# Patient Record
Sex: FEMALE | Race: WHITE | HISPANIC OR LATINO | Employment: UNEMPLOYED | URBAN - METROPOLITAN AREA
[De-identification: names, ages, dates, MRNs, and addresses within clinical notes are randomized per-mention and may not be internally consistent; named-entity substitution may affect disease eponyms.]

---

## 2018-01-10 NOTE — PROGRESS NOTES
AUG 22 2016         RE: Irma Stein                               To: THUY   MR#: [id number]                                   [address]   : [address]    ENC: [id number]                             Fax: [phone]   (Exam #: [id number])      The LMP of this 26year old,  G2, P1-0-0-1 patient was unknown, her   working RAMIREZ is DEC 9 2016 and the current gestational age is 23 weeks 3   days by 1st Trimester Sono  A sonographic examination was performed on AUG   22 2016 using real time equipment  The ultrasound examination was   performed using abdominal technique  The patient has a BMI of 22 1  Her   blood pressure today was 110/63  Earliest ultrasound done in Radiology on 5/10/16  9w4d and 9w2d 16   RAMIREZ based on the larger baby      Problem list   1  Monochorionic diamniotic twin pregnancy with a central PCI times 2 and   both babies are male  2  History of a small for gestational age baby at term weighing 5 lbs  3   oz        Fetus A   Cardiac motion was observed at 156 bpm       Fetus B   Cardiac motion was observed at 142 bpm       INDICATIONS      multiple gestation- monochorionic  diamniotic twins   Interval growth assesment   TTS surveillence      Exam Types      Level I      RESULTS      Fetus # 1 of 2   Vertex presentation   Fetal growth appeared normal   Fetal position = Maternal Right   Placenta Location = Posterior   No placenta previa   Placenta Grade = II   Chorionicity = Monochorionic, Diamniotic      Fetus # 2 of 2   Breech presentation   Fetal growth appeared normal   Fetal position = Maternal Left   Placenta Location = Posterior, left lateral   Placenta Grade = II   Chorionicity = Monochorionic, Diamniotic      MEASUREMENTS (* Included In Average GA)      FETUS A   AC              18 8 cm        23 weeks 3 days* (28%)   BPD              5 6 cm        23 weeks 0 days* (16%) HC              21 2 cm        23 weeks 1 day * (13%)   Femur            4 3 cm        24 weeks 3 days* (39%)      Cerebellum       2 6 cm        24 weeks 6 days      HC/AC           1 13   FL/AC           0 23   FL/BPD          0 78   EFW (Ac/Fl/Hc)   623 grams - 1 lbs 6 oz                 (35%)      Fetus A AVERAGE G  A  is 23 weeks 4 days +/- 14 days  FETUS B   AC              18 8 cm        23 weeks 2 days* (27%)   BPD              5 6 cm        23 weeks 1 day * (20%)   HC              21 0 cm        23 weeks 0 days* (9%)   Femur            4 2 cm        23 weeks 6 days* (30%)      Cerebellum       2 7 cm        25 weeks 0 days      HC/AC           1 12   FL/AC           0 22   FL/BPD          0 75   EFW (Ac/Fl/Hc)   603 grams - 1 lbs 5 oz                 (32%)      Fetus B AVERAGE G  A  is 23 weeks 3 days +/- 14 days  AMNIOTIC FLUID      Fetus A      Largest Vertical Pocket = 3 6 cm   Amniotic Fluid: Normal         Fetus B      Largest Vertical Pocket = 4 3 cm   Amniotic Fluid: Normal      CERVICAL EVALUATION      SUPINE      Cervical Length: 4 43 cm      ANATOMY DETAILS      Fetus A   Visualized Appearing Sonographically Normal:   HEAD: (Calvarium, BPD Level, Cavum, Lateral Ventricles, Choroid Plexus,   Cerebellum, Cisterna Magna);    TH  CAV : (Diaphragm); HEART: (Four   Chamber View, Interventricular Septum, Interatrial Septum, Cardiac Axis,   Cardiac Position);    STOMACH, RIGHT KIDNEY, LEFT KIDNEY, BLADDER, PLACENTA      Fetus B   Visualized Appearing Sonographically Normal:   HEAD: (Calvarium, BPD Level, Lateral Ventricles, Choroid Plexus,   Cerebellum);    TH  CAV : (Diaphragm);     HEART: (Interventricular Septum,   Interatrial Septum);    STOMACH, RIGHT KIDNEY, LEFT KIDNEY, BLADDER,   PLACENTA      IMPRESSION      Twin IUP (Fetus A)   23 weeks and 4 days by this ultrasound  (RAMIREZ=DEC 15 2016)   24 weeks and 3 days by 1st Tri Sono  (RAMIREZ=DEC 9 2016)   Vertex presentation   Fetal growth appeared normal   Regular fetal heart rate of 156 bpm   Posterior placenta   No placenta previa   Fetal position = Maternal , Right   Monochorionic, diamniotic      Twin IUP (Fetus B)   23 weeks and 3 days by this ultrasound  (RAMIREZ=DEC 16 2016)   24 weeks and 3 days by 1st Tri Sono  (RAMIREZ=DEC 9 2016)   Breech presentation   Fetal growth appeared normal   Regular fetal heart rate of 142 bpm   Posterior, left lateral placenta   Fetal position = Maternal , Left   Monochorionic, diamniotic      GENERAL COMMENT      A normal bladder and stomach are seen on both babies  Normal fluid is seen   in both sacs  Both babies are active  There is no suggestion by ultrasound   today for twin to twin transfusion  Plan review for TTTS in 2 weeks and growth and review for TTTS in 4 weeks  BRETT Contreras M D     Maternal-Fetal Medicine   Electronically signed 08/22/16 17:51

## 2018-01-11 NOTE — PROGRESS NOTES
NOV 3 2016         RE: Allison Kolb                               To: THUY   MR#: [id number]                                   [address]   : [address]   [facility] 25: [id number]                             Fax: [phone]   (Exam #: [id number])      The LMP of this 26year old,  G2, P1-0-0-1 patient was unknown, her   working RAMIREZ is DEC 9 2016 and the current gestational age is 29 weeks 6   days by 1st Trimester Sono  A sonographic examination was performed on NOV   3 2016 using real time equipment  The ultrasound examination was performed   using abdominal technique  The patient has a BMI of 25 0  Her blood   pressure today was 128/78  Earliest ultrasound done in Radiology on 5/10/16  9w4d and 9w2d 16   RAMIREZ based on the larger baby      Problem list   1  Monochorionic diamniotic twin pregnancy with a central PCI times 2 and   both babies are male  2  History of a small for gestational age baby at term weighing 6 lbs  3   oz  3  IUGR "B" at 33+ weeks      Fetus A   Cardiac motion was observed at 155 bpm       Fetus B   Cardiac motion was observed at 144 bpm       INDICATIONS      multiple gestation- monochorionic  diamniotic twins      Exam Types      amniotic fluid evaluation   NST      RESULTS      Fetus # 1 of 2   Vertex presentation   Fetal position = Superior, Maternal Right   Placenta Location = Posterior, left lateral   No placenta previa   Placenta Grade = II   Chorionicity = Monochorionic, Diamniotic      Fetus # 2 of 2   Vertex presentation   Fetal position = Inferior, Maternal Left   Placenta Location = Left lateral   No placenta previa   Placenta Grade = II   Chorionicity = Monochorionic, Diamniotic      Fetus A   The NST was reactive with no decelerations  Fetus B   The NST was reactive with no decelerations        AMNIOTIC FLUID      Fetus A      Largest Vertical Pocket = 5 5 cm   Amniotic Fluid: Normal         Fetus B      Largest Vertical Pocket = 3 4 cm   Amniotic Fluid: Normal      IMPRESSION      Twin IUP (Fetus A)   34 weeks and 6 days by 1st Tri Sono  (RAMIREZ=DEC 9 2016)   Vertex presentation   Fetal heart rate of 155 bpm   Posterior, left lateral placenta   No placenta previa   Fetal position = Superior, Right   Monochorionic, diamniotic      Twin IUP (Fetus B)   34 weeks and 6 days by 1st Tri Sono  (RAMIREZ=DEC 9 2016)   Vertex presentation   Regular fetal heart rate of 144 bpm   Left lateral placenta   No placenta previa   Fetal position = Inferior, Left   Monochorionic, diamniotic      RECOMMENDATION      PAL: 1 Week   NST: 2X per week      BRETT Dangelo M D     Maternal-Fetal Medicine   Electronically signed 11/04/16 15:37

## 2018-01-11 NOTE — PROGRESS NOTES
OCT 24 2016         RE: Tang Son                               To: Harley Betancur   MR#: [id number]                                   [address]   : [address] 25: [id number]                             Fax: [phone]   (Exam #: [id number])      The LMP of this 26year old,  G2, P1-0-0-1 patient was unknown, her   working RAMIREZ is DEC 9 2016 and the current gestational age is 29 weeks 3   days by 1st Trimester Sono  A sonographic examination was performed on OCT   24 2016 using real time equipment  The ultrasound examination was   performed using abdominal technique  The patient has a BMI of 24 8  Her   blood pressure today was 127/67  Earliest ultrasound done in Radiology on 5/10/16  9w4d and 9w2d 16   RAMIREZ based on the larger baby      Problem list   1  Monochorionic diamniotic twin pregnancy with a central PCI times 2 and   both babies are male  2  History of a small for gestational age baby at term weighing 6 lbs  3   oz  3  IUGR "B" at 33+ weeks         Mary has no complaints today  She reports regular fetal movements and   denies problems related to hypertension, gestational diabetes,    labor, or vaginal bleeding        Fetus A   Cardiac motion was observed at 157 bpm       Fetus B   Cardiac motion was observed at 130 bpm       INDICATIONS      multiple gestation- monochorionic  diamniotic twins      Exam Types      Level I   NST   Doppler study      RESULTS      Fetus # 1 of 2   Vertex presentation   Fetal growth appeared normal   Fetal position = Maternal Right   Placenta Location = Posterior   No placenta previa   Placenta Grade = II   Chorionicity = Monochorionic, Diamniotic      Fetus # 2 of 2   Vertex presentation   Probable symmetric IUGR   Fetal position = Maternal Left   Placenta Location = Posterior   No placenta previa   Placenta Grade = II   Chorionicity = Monochorionic, Diamniotic      Fetus A   The NST was reactive with no decelerations  Fetus B   The NST was reactive with no decelerations  MEASUREMENTS (* Included In Average GA)      FETUS A   AC              28 9 cm        33 weeks 0 days* (40%)   BPD              7 6 cm        30 weeks 4 days* (<5%)   HC              28 2 cm        30 weeks 4 days* (<5%)   Femur            6 0 cm        31 weeks 1 day * (14%)      Humerus          5 3 cm        30 weeks 5 days  (12%)      Cerebellum       4 2 cm        34 weeks 2 days      HC/AC           0 98   FL/AC           0 21   FL/BPD          0 79   EFW (Ac/Fl/Hc)  1887 grams - 4 lbs 3 oz                 (33%)      Fetus A AVERAGE G  A  is 31 weeks 2 days +/- 18 days  FETUS B   AC              25 5 cm        29 weeks 5 days* (<5%)   BPD              7 8 cm        31 weeks 3 days* (8%)   HC              27 7 cm        30 weeks 0 days* (<5%)   Femur            6 1 cm        31 weeks 4 days* (20%)      Humerus          5 3 cm        30 weeks 5 days  (12%)      HC/AC           1 08   FL/AC           0 24   FL/BPD          0 78   EFW (Ac/Fl/Hc)  1566 grams - 3 lbs 7 oz                 (7%)      Fetus B AVERAGE G  A  is 30 weeks 5 days +/- 18 days  AMNIOTIC FLUID      Fetus A      Largest Vertical Pocket = 3 3 cm   Amniotic Fluid: Normal         Fetus B      Largest Vertical Pocket = 4 4 cm   Amniotic Fluid: Normal      FETAL VESSELS      Fetus B                                  S/D   PI    RI    PSV   AEDV RF                                                    cm/s       Umbilical Artery                 0 62       Middle Cerebral Artery           1 41      ANATOMY DETAILS      Fetus A   Visualized Appearing Sonographically Normal:   HEAD: (Calvarium, BPD Level, Cavum, Lateral Ventricles, Cerebellum);      TH  CAV : (Diaphragm);     HEART: ([employer], Cardiac Axis);      STOMACH, RIGHT KIDNEY, LEFT KIDNEY, BLADDER, PLACENTA      Fetus B   Visualized Appearing Sonographically Normal:   HEAD: (Calvarium, BPD Level);    TH  CAV : (Diaphragm); HEART: (Four   Chamber View, Proximal Left Outflow, Proximal Right Outflow, 3 Vessel   Trachea, Cardiac Axis);    STOMACH, RIGHT KIDNEY, LEFT KIDNEY, BLADDER,   PLACENTA      IMPRESSION      Twin IUP (Fetus A)   31 weeks and 2 days by this ultrasound  (RAMIREZ=DEC 24 2016)   33 weeks and 3 days by 1st Tri Sono  (RAMIREZ=DEC 9 2016)   Vertex presentation   Fetal growth appeared normal   Regular fetal heart rate of 157 bpm   Posterior placenta   No placenta previa   Fetal position = Maternal , Right   Monochorionic, diamniotic      Twin IUP (Fetus B)   30 weeks and 5 days by this ultrasound  (RAMIREZ=DEC 28 2016)   33 weeks and 3 days by 1st Tri Sono  (RAMIREZ=DEC 9 2016)   Vertex presentation   Growth assessment indicated probable symmetric IUGR   Regular fetal heart rate of 130 bpm   Posterior placenta   No placenta previa   Fetal position = Maternal , Left   Monochorionic, diamniotic      GENERAL COMMENT      No fetal structural abnormality is identified on the Level I surveys   today  Fetus "A" is normally grown, with appropriate interval growth  Fetus "B"  is growth restricted, with an estimated fetal weight placed at   the 7th percentile for this gestational age  The intertwin growth   discordance is 17%  The  amniotic fluid volumes are normal  The Doppler   studies with respect to fetus "B" are normal  The cerebroplacental ratio   is normal       Today's ultrasound findings and suggested follow-up were discussed in   detail with Mary  Continuation of twice per week fetal nonstress testing   and weekly amniotic fluid volume assessments is recommended  Repeat fetal   Doppler studies are recommended with respect to fetus "B"  in one week  Interval growth scans will be performed in 2 weeks   Delivery of this   monochorionic, diamniotic twin pregnancy is recommended at 39 to 37 weeks   gestation, earlier if otherwise clinically indicated  The face to face time, in addition to time spent discussing ultrasound   results, was approximately 10 minutes, greater than 50% of which was spent   during counseling and coordination of care  BRETT Marie M D     Maternal-Fetal Medicine   Electronically signed 10/24/16 18:40

## 2018-01-12 NOTE — PROGRESS NOTES
SEP 6 2016         RE: Stuely Dona                               To: Harley Estr[address] 26   MR#: [id number]                                   [address]   : [address] 25: [id number]                             Fax: [phone]   (Exam #: [id number])      The LMP of this 26year old,  G2, P1-0-0-1 patient was unknown, her   working RAMIREZ is DEC 9 2016 and the current gestational age is 29 weeks 4   days by 1st Trimester Sono  A sonographic examination was performed on SEP   6 2016 using real time equipment  The ultrasound examination was performed   using abdominal technique  The patient has a BMI of 22 5  Her blood   pressure today was 104/54  Earliest ultrasound done in Radiology on 5/10/16  9w4d and 9w2d 16   RAMIREZ based on the larger baby      Problem list   1  Monochorionic diamniotic twin pregnancy with a central PCI times 2 and   both babies are male  2  History of a small for gestational age baby at term weighing 5 lbs  3   oz  Mary has no complaints today  She reports regular fetal movements and   denies problems related to hypertension, gestational diabetes,    labor, or vaginal bleeding        Fetus A   Cardiac motion was observed at 149 bpm       Fetus B   Cardiac motion was observed at 147 bpm       INDICATIONS      multiple gestation- monochorionic  diamniotic twins      Exam Types      Level I      RESULTS      Fetus # 1 of 2   Vertex presentation   Fetal position = Inferior, Maternal Right   Placenta Location = Posterior   No placenta previa   Placenta Grade = I   Chorionicity = Monochorionic, Diamniotic      Fetus # 2 of 2   Breech presentation   Fetal position = Superior, Maternal Left   Placenta Location = Posterior, left lateral   No placenta previa   Placenta Grade = I   Chorionicity = Monochorionic, Diamniotic      AMNIOTIC FLUID      Fetus A      Largest Vertical Pocket = 4 1 cm   Amniotic Fluid: Normal         Fetus B      Largest Vertical Pocket = 3 1 cm   Amniotic Fluid: Normal      ANATOMY DETAILS      Fetus A   Visualized Appearing Sonographically Normal:   STOMACH, BLADDER      Fetus B   Visualized Appearing Sonographically Normal:   STOMACH, BLADDER      IMPRESSION      Twin IUP (Fetus A)   26 weeks and 4 days by 1st Tri Sono  (RAMIREZ=DEC 9 2016)   Vertex presentation   Regular fetal heart rate of 149 bpm   Posterior placenta   No placenta previa   Fetal position = Inferior, Right   Monochorionic, diamniotic      Twin IUP (Fetus B)   26 weeks and 4 days by 1st Tri Sono  (RAMIREZ=DEC 9 2016)   Breech presentation   Regular fetal heart rate of 147 bpm   Posterior, left lateral placenta   No placenta previa   Fetal position = Superior, Left   Monochorionic, diamniotic      GENERAL COMMENT      Detailed evaluations of fetal growth and anatomy were not performed at the   visit today  Instead, ultrasound evaluation was performed for twin to twin   transfusion surveillance  Normal amniotic fluid volumes are noted on   either side of a thin, single-layered dividing membrane  Each fetus has a   normal-appearing urinary bladder  There is no evidence of evolving TTTS  The single posterior placenta is normal in appearance  Today's ultrasound findings and suggested follow-up were discussed in   detail with Mary  She will return to the [facility]  in 2 weeks for   TTTS surveillance and interval fetal growth scans  Serial ultrasound   studies are then recommended throughout the remainder of the pregnancy  Additional pregnancy surveillance is recommended with non stress testing   beginning at 30-32 weeks gestation  Delivery of this monochorionic,   diamniotic twin pregnancy is recommended at 36-37 weeks gestation, earlier   if otherwise clinically indicated        The face to face time, in addition to time spent discussing ultrasound   results, was 10 minutes, greater than 50% of which was spent during   counseling and coordination of care  BRETT Reyes M D     Maternal-Fetal Medicine   Electronically signed 09/07/16 10:22

## 2018-01-13 NOTE — PROGRESS NOTES
SEP 20 2016         RE: Siva Kessler                               To: THUY   MR#: [id number]                                   [address]   : [address] 25: [id number]                             Fax: [phone]   (Exam #: [id number])      The LMP of this 26year old,  G2, P1-0-0-1 patient was unknown, her   working RAMIREZ is DEC 9 2016 and the current gestational age is 35 weeks 4   days by 1st Trimester Sono  A sonographic examination was performed on SEP   20 2016 using real time equipment  The ultrasound examination was   performed using abdominal technique  The patient has a BMI of 19 5  Her   blood pressure today was 98/67  Earliest ultrasound done in Radiology on 5/10/16  9w4d and 9w2d 16   RAMIREZ based on the larger baby      Problem list   1  Monochorionic diamniotic twin pregnancy with a central PCI times 2 and   both babies are male  2  History of a small for gestational age baby at term weighing 6 lbs  3   oz        Fetus A   Cardiac motion was observed at 153 bpm       Fetus B   Cardiac motion was observed at 148 bpm       INDICATIONS      multiple gestation- monochorionic  diamniotic twins   fetal growth      Exam Types      Level I      RESULTS      Fetus # 1 of 2   Breech presentation   Fetal growth appeared normal   Fetal position = Inferior, Maternal Right   Placenta Location = Posterior   No placenta previa   Placenta Grade = II   Chorionicity = Monochorionic, Diamniotic      Fetus # 2 of 2   Vertex presentation   Fetal growth appeared normal   Fetal position = Maternal Left   Placenta Location = Posterior, left lateral   No placenta previa   Placenta Grade = II      MEASUREMENTS (* Included In Average GA)      FETUS A   AC              23 7 cm        28 weeks 0 days* (34%)   BPD              6 8 cm        27 weeks 3 days* (20%)   HC              25 1 cm        27 weeks 0 days* (<5%)   Femur            5 2 cm        28 weeks 0 days* (28%)      Cerebellum       3 1 cm        28 weeks 2 days      HC/AC           1 06   FL/AC           0 22   FL/BPD          0 77   EFW (Ac/Fl/Hc)  1135 grams - 2 lbs 8 oz                 (31%)      Fetus A AVERAGE G  A  is 27 weeks 4 days +/- 14 days  FETUS B   AC              23 0 cm        27 weeks 2 days* (22%)   BPD              6 6 cm        26 weeks 4 days* (<5%)   HC              24 6 cm        26 weeks 4 days* (<5%)   Femur            5 1 cm        27 weeks 3 days* (18%)      Cerebellum       3 2 cm        28 weeks 4 days      HC/AC           1 07   FL/AC           0 22   FL/BPD          0 78   EFW (Ac/Fl/Hc)  1045 grams - 2 lbs 5 oz                 (23%)      Fetus B AVERAGE G  A  is 26 weeks 6 days +/- 14 days  AMNIOTIC FLUID      Fetus A      Largest Vertical Pocket = 4 0 cm   Amniotic Fluid: Normal         Fetus B      Largest Vertical Pocket = 4 2 cm   Amniotic Fluid: Normal      ANATOMY DETAILS      Fetus A   Visualized Appearing Sonographically Normal:   HEAD: (Calvarium, BPD Level, Cavum, Lateral Ventricles, Choroid Plexus,   Cerebellum);    TH  CAV : (Diaphragm); HEART: ([employer] [facility],   Cardiac Axis, Cardiac Position);    ABD  CAV , STOMACH, RIGHT KIDNEY, LEFT   KIDNEY, BLADDER, PLACENTA      Not Visualized:   HEART: (Proximal Left Outflow, Proximal Right Outflow)      Fetus B   Visualized Appearing Sonographically Normal:   HEAD: (Calvarium, BPD Level, Cavum, Lateral Ventricles, Choroid Plexus,   Cerebellum);    TH  CAV : (Diaphragm);     HEART: (Four Chamber View,   Proximal Left Outflow, Proximal Right Outflow, 3 Vessel Trachea, Short   Axis of Greater Vessels, Interventricular Septum, Interatrial Septum,   Cardiac Axis, Cardiac Position);    ABD  CAV , STOMACH, RIGHT KIDNEY, LEFT   KIDNEY, BLADDER, PLACENTA      IMPRESSION      Twin IUP (Fetus A)   27 weeks and 4 days by this ultrasound  (RAMIREZ=DEC 16 2016)   28 weeks and 4 days by 1st Tri Sono  (RAMIREZ=DEC 9 2016)   Breech presentation   Fetal growth appeared normal   Regular fetal heart rate of 153 bpm   Posterior placenta   No placenta previa   Fetal position = Inferior, Right   Monochorionic, diamniotic      Twin IUP (Fetus B)   26 weeks and 6 days by this ultrasound  (RAMIREZ=DEC 21 2016)   28 weeks and 4 days by 1st Tri Sono  (RAMIREZ=DEC 9 2016)   Vertex presentation   Fetal growth appeared normal   Regular fetal heart rate of 148 bpm   Posterior, left lateral placenta   No placenta previa   Fetal position = Maternal , Left      GENERAL COMMENT      On exam today the patient appears well, in no acute distress, and denies   any complaints  Her abdomen is non-tender  There has been appropriate and concordant twins interval fetal growth  Good fetal movement and tone are seen  The amniotic fluid volume appears   normal around each twin  The anatomic structures listed above could not be   optimally imaged today because of fetal position; however, these   structures were seen on a prior ultrasound and appeared sonographically   normal   The patient was informed of today's findings and all of her   questions were answered  The limitations of ultrasound were reviewed with   the patient   labor precautions and fetal kick counts were   reviewed  Recommend the patient return in 2 weeks for twin-twin transfusion syndrome   and in 4 weeks for a fetal growth evaluation  Thank you very much for allowing us to participate in the care of this   very nice patient  Should you have any questions, please do not hesitate   to contact our office  BRETT Solorio M D     Electronically signed 16 17:10

## 2018-01-13 NOTE — PROGRESS NOTES
OCT 3 2016         RE: Oswaldo Lot                               To: THUY   MR#: [id number]                                   [address]   : [address]   ENC:                                              Fax: [phone]   (Exam #: [id number])      The LMP of this 26year old,  G2, P1-0-0-1 patient was unknown, her   working RAMIREZ is DEC 5 2016 and the current gestational age is 31 weeks 3   days by 1st Trimester Sono  A sonographic examination was performed on OCT   3 2016 using real time equipment  The ultrasound examination was performed   using abdominal technique  The patient has a BMI of 23 6  Her blood   pressure today was 99/55  Earliest ultrasound done in Radiology on 5/10/16  9w4d and 9w2d 16   RAMIREZ based on the larger baby      Problem list   1  Monochorionic diamniotic twin pregnancy with a central PCI times 2 and   both babies are male  2  History of a small for gestational age baby at term weighing 6 lbs  3   oz        Fetus A   Cardiac motion was observed at 151 bpm       Fetus B   Cardiac motion was observed at 150 bpm       INDICATIONS      multiple gestation- monochorionic  diamniotic twins      Exam Types      mono/di  twin gest   Amniotic Fluid Index      RESULTS      Fetus # 1 of 2   Vertex presentation   Fetal position = Maternal Right   Placenta Location = Posterior   No placenta previa   Placenta Grade = II   Chorionicity = Monochorionic, Diamniotic      Fetus # 2 of 2   Vertex presentation   Fetal position = Maternal Left   Placenta Location = Posterior   No placenta previa   Placenta Grade = II   Chorionicity = Monochorionic, Diamniotic      AMNIOTIC FLUID      Fetus A      Largest Vertical Pocket = 6 1 cm   Amniotic Fluid: Normal         Fetus B      Largest Vertical Pocket = 6 0 cm   Amniotic Fluid: Normal      ANATOMY DETAILS      Fetus A   Visualized Appearing Sonographically Normal:   STOMACH, BLADDER      Fetus B   Visualized Appearing Sonographically Normal:   STOMACH, BLADDER      IMPRESSION      Twin IUP (Fetus A)   30 weeks and 3 days by 1st Tri Sono  (RAMIREZ=DEC 9 2016)   Vertex presentation   Regular fetal heart rate of 151 bpm   Posterior placenta   No placenta previa   Fetal position = Maternal , Right   Monochorionic, diamniotic      Twin IUP (Fetus B)   30 weeks and 3 days by 1st Tri Sono  (RAMIREZ=DEC 9 2016)   Vertex presentation   Regular fetal heart rate of 150 bpm   Posterior placenta   No placenta previa   Fetal position = Maternal , Left   Monochorionic, diamniotic      GENERAL COMMENT      There is no evidence to suggest evolving twin-twin transfusion based upon   today's ultrasound  The patient is scheduled to return in 2 weeks for a   fetal growth ultrasound and to initiate antepartum surveillance  BRETT Quiroga M D     Electronically signed 10/03/16 14:59

## 2018-01-15 NOTE — MISCELLANEOUS
Provider Comments  Provider Comments:   S/W pt's  who states they forgot about this appt as they [place] [facility] [place]   [place] an [address] today  Rescheduled for next week   CE      Signatures   Electronically signed by : CHASITY Garrett ; Sep 21 2016  5:47PM EST

## 2018-01-15 NOTE — PROGRESS NOTES
NOV 10 2016         RE: Adarsh Hooper                               To: [facility]   MR#: [id number]                                   [address]   : [address]   [facility] 25: [id number]                             Fax: [phone]   (Exam #: [id number])      The LMP of this 26year old,  G2, P1-0-0-1 patient was unknown, her   working RAMIREZ is DEC 9 2016 and the current gestational age is 27 weeks 6   days by 1st Trimester Sono  A sonographic examination was performed on NOV   10 2016 using real time equipment  The ultrasound examination was   performed using abdominal technique  The patient has a BMI of 24 8  Her   blood pressure today was 133/82  Earliest ultrasound done in Radiology on 5/10/16  9w4d and 9w2d 16   RAMIREZ based on the larger baby      Problem list   1  Monochorionic diamniotic twin pregnancy with a central PCI times 2 and   both babies are male  2  History of a small for gestational age baby at term weighing 6 lbs  3   oz  3  IUGR "B" at 33+ weeks         Mary has no complaints  She reports regular fetal movements and denies   problems related to vaginal bleeding,  labor, or hypertension        Fetus A   Cardiac motion was observed at 131 bpm       Fetus B   Cardiac motion was observed at 141 bpm       INDICATIONS      multiple gestation- monochorionic  diamniotic twins   intrauterine growth restriction      Exam Types      Level I   NST   Doppler study      RESULTS      Fetus # 1 of 2   Vertex presentation   Fetal growth appeared normal   Fetal position = Superior, Maternal Right   Placenta Location = Posterior   No placenta previa   Placenta Grade = II   Chorionicity = Monochorionic, Diamniotic      Fetus # 2 of 2   Vertex presentation   Probable symmetric IUGR   Fetal position = Inferior, Maternal Left   Placenta Location = Posterior   No placenta previa   Placenta Grade = II Chorionicity = Monochorionic, Diamniotic      Fetus A   The NST was reactive with no decelerations  Fetus B   The NST was reactive with no decelerations  MEASUREMENTS (* Included In Average GA)      FETUS A   AC              30 6 cm        34 weeks 5 days* (31%)   BPD              8 3 cm        33 weeks 1 day * (<5%)   HC              29 9 cm        32 weeks 6 days* (<5%)   Femur            6 4 cm        32 weeks 5 days* (<5%)      HC/AC           0 98   FL/AC           0 21   FL/BPD          0 77   EFW (Ac/Fl/Hc)  2274 grams - 5 lbs 0 oz                 (31%)      Fetus A AVERAGE G  A  is 33 weeks 3 days +/- 18 days  FETUS B   AC              26 6 cm        30 weeks 5 days* (<5%)   BPD              8 1 cm        32 weeks 5 days* (<5%)   HC              28 7 cm        31 weeks 2 days* (<5%)   Femur            6 0 cm        31 weeks 2 days* (<5%)      HC/AC           1 08   FL/AC           0 23   FL/BPD          0 74   EFW (Ac/Fl/Hc)  1694 grams - 3 lbs 12 oz                 (<5%)      Fetus B AVERAGE G  A  is 31 weeks 4 days +/- 18 days  AMNIOTIC FLUID      Fetus A      Largest Vertical Pocket = 4 0 cm   Amniotic Fluid: Normal         Fetus B      Largest Vertical Pocket = 4 5 cm   Amniotic Fluid: Normal      FETAL VESSELS      Fetus B                                  S/D   PI    RI    PSV   AEDV RF                                                    cm/s       Umbilical Artery                 0 95              No   No       Middle Cerebral Artery           1 76      ANATOMY DETAILS      Fetus A   Visualized Appearing Sonographically Normal:   HEAD: (Calvarium, BPD Level, Cerebellum);    TH  CAV : (Diaphragm); HEART: ([facility]);    STOMACH, RIGHT KIDNEY, LEFT KIDNEY,   BLADDER, PLACENTA      Fetus B   Visualized Appearing Sonographically Normal:   HEAD: (Calvarium, BPD Level);    TH  CAV : (Diaphragm);     HEART: (Four   Chamber View, Proximal Left Outflow);    STOMACH, RIGHT KIDNEY, LEFT   KIDNEY, BLADDER, PLACENTA      IMPRESSION      Twin IUP (Fetus A)   33 weeks and 3 days by this ultrasound  (RAMIREZ=DEC 26 2016)   35 weeks and 6 days by 1st Tri Sono  (RAMIREZ=DEC 9 2016)   Vertex presentation   Fetal growth appeared normal   Regular fetal heart rate of 131 bpm   Posterior placenta   No placenta previa   Fetal position = Superior, Right   Monochorionic, diamniotic      Twin IUP (Fetus B)   31 weeks and 4 days by this ultrasound  (RAMIREZ=JAN 8 2017)   35 weeks and 6 days by 1st Tri Sono  (RAMIREZ=DEC 9 2016)   Vertex presentation   Growth assessment indicated probable symmetric IUGR   Regular fetal heart rate of 141 bpm   Posterior placenta   No placenta previa   Fetal position = Inferior, Left   Monochorionic, diamniotic      GENERAL COMMENT      No fetal structural abnormalities are identified on the Level I surveys   today  There is a 25% growth discordance between the twins  Fetus "A" is   appropriately grown at the 31st percentile with normal interval growth   since the October 24 ultrasound study  Fetus B is growth restricted,   measured at less than the 5th %ile for this gestational age,  with   suboptimal interval growth since October 24  The amniotic fluid volumes   are normal  The Doppler studies of the growth restricted fetus are normal    The cerebroplacental ratio of fetus "B" is normal       Today's ultrasound findings and suggested management were discussed in   detail with Mary and her   I recommended that she be admitted to   Labor and Delivery in [place] this evening for further evaluation and   management  Labor induction is recommended for the indication of   monochorionic, diamniotic twins at 35-6/7 weeks gestation with significant   growth restriction of one of fetuses  Neonatology should be made aware of   the plan for delivery        The face to face time, in addition to time spent discussing ultrasound   results, was approximately 10 minutes, greater than 50% of which was spent   during counseling and coordination of care  BRETT Fink M D     Maternal-Fetal Medicine   Electronically signed 11/10/16 19:17

## 2018-01-16 NOTE — PROGRESS NOTES
2016         RE: Selina Lee                               To: [facility]   MR#: [id number]                                   [address]   : [address]   [facility] 25: [id number]                             Fax: [phone]   (Exam #: [id number])      The LMP of this 26year old,  G2, P1-0-0-1 patient was unknown, her   working RAMIREZ is DEC 9 2016 and the current gestational age is 25 weeks 3   days by 1st Trimester Sono  A sonographic examination was performed on 2016 using real time equipment  The ultrasound examination was   performed using abdominal & vaginal techniques  The patient has a BMI of   21 5  Her blood pressure today was 117/60  Radiology ultrasound on 5/10 gives EDC of 2017      Thank you very much for referring this very nice patient for a    consultation and fetal anatomic survey  This is the patient's second   pregnancy  The patient presented to the emergency Department on May 10   where she underwent an ultrasound performed in the radiology Department   diagnosing her with twins  There was no mention of the chorionicity on   this report  Her first pregnancy resulted in a full-term vaginal delivery   in April of last year  She has no significant medical or surgical history   otherwise  She denies the current use of tobacco, or come or drugs  Her   medications include vitamins and she has no significant drug allergies  Her family medical history is noncontributory        Fetus A   Cardiac motion was observed at 148 bpm       Fetus B   Cardiac motion was observed at 149 bpm       INDICATIONS      fetal anatomical survey      Exam Types      Transvaginal   LEVEL II      RESULTS      Fetus # 1 of 2   Breech presentation   Fetal growth appeared normal   Fetal position = Inferior, Maternal Left   Placenta Location = Fundal   No placenta previa   Placenta Grade = II   Chorionicity = Monochorionic, Diamniotic      Fetus # 2 of 2   Vertex presentation   Fetal growth appeared normal   Placenta Location = Posterior   No placenta previa   Placenta Grade = II   Chorionicity = Monochorionic, Diamniotic      MEASUREMENTS (* Included In Average GA)      FETUS A   AC              14 4 cm        19 weeks 4 days* (33%)   BPD              4 5 cm        19 weeks 4 days* (28%)   HC              16 8 cm        19 weeks 3 days* (19%)   Femur            3 2 cm        20 weeks 1 day * (35%)      Nuchal Fold      3 5 mm      Humerus          3 0 cm        19 weeks 6 days  (39%)   Radius           2 6 cm        20 weeks 6 days   Ulna             2 9 cm        20 weeks 4 days   Tibia            2 8 cm        20 weeks 2 days  (39%)   Fibula           2 7 cm        18 weeks 6 days   Foot             3 8 cm        21 weeks 6 days      Cerebellum       2 0 cm        20 weeks 1 day   Biorbit          3 0 cm        19 weeks 3 days   CisternaMagna    5 7 mm      HC/AC           1 16   FL/AC           0 22   FL/BPD          0 71   EFW (Ac/Fl/Hc)   309 grams - 0 lbs 11 oz      Fetus A AVERAGE G  A  is 19 weeks 4 days +/- 10 days        FETUS B   AC              14 6 cm        19 weeks 4 days* (35%)   BPD              4 6 cm        19 weeks 6 days* (35%)   HC              17 3 cm        19 weeks 6 days* (30%)   Femur            3 3 cm        20 weeks 3 days* (42%)      Nuchal Fold      3 1 mm      Humerus          3 1 cm        20 weeks 3 days  (51%)   Radius           2 7 cm        21 weeks 1 day   Ulna             3 0 cm        21 weeks 3 days   Tibia            2 9 cm        20 weeks 4 days  (49%)   Fibula           2 9 cm        19 weeks 4 days   Foot             3 5 cm        20 weeks 6 days      Cerebellum       2 0 cm        20 weeks 1 day   Biorbit          3 1 cm        19 weeks 6 days   CisternaMagna    7 2 mm      HC/AC           1 19   FL/AC           0 22   FL/BPD          0 72   EFW (Ac/Fl/Hc) 324 grams - 0 lbs 11 oz      Fetus B AVERAGE G  A  is 19 weeks 6 days +/- 10 days  AMNIOTIC FLUID      Fetus A      Largest Vertical Pocket = 4 7 cm   Amniotic Fluid: Normal         Fetus B      Largest Vertical Pocket = 3 7 cm   Amniotic Fluid: Normal      UTERINE ARTERIES                                  S/D   PI    RI    NOTCH       Left Uterine Artery        2 78  1 13  0 64       Right Uterine Artery       2 16  0 81  0 54      CERVICAL EVALUATION      SUPINE      Cervical Length: 3 50 cm      OTHER TEST RESULTS           Funneling?: No             Dynamic Changes?: No        Resp  To TFP?: No      ANATOMY      Fetus A   Head                                    Normal   Face/Neck                               Normal   Th  Cav  Normal   Heart                                   Normal   Abd  Cav  Normal   Stomach                                 Normal   Right Kidney                            Normal   Left Kidney                             Normal   Bladder                                 Normal   Abd  Wall                               Normal   Spine                                   Normal   Extrems                                 Normal   Genitalia                               Normal   Placenta                                Normal   Umbl  Cord                              Normal   Uterus                                  Normal   PCI                                     Normal      Fetus B   Head                                    Normal   Face/Neck                               Normal   Th  Cav  Normal   Heart                                   Normal   Abd  Cav  Normal   Stomach                                 Normal   Right Kidney                            Normal   Left Kidney                             Normal   Bladder                                 Normal   Abd   [place] Normal   Spine                                   Normal   Extrems                                 Normal   Genitalia                               Normal   Placenta                                Normal   Umbl  Cord                              Normal   Uterus                                  Normal   PCI                                     Normal      ANATOMY DETAILS      Fetus A   Visualized Appearing Sonographically Normal:   HEAD: (Calvarium, BPD Level, Cavum, Lateral Ventricles, Choroid Plexus,   Cerebellum, Cisterna Magna);    FACE/NECK: (Neck, Nuchal Fold, Profile,   Orbits, Nose/Lips, Palate, Face);    TH  CAV : (Diaphragm); HEART:   (Four Chamber View, Proximal Left Outflow, Proximal Right Outflow, 3   Vessel Trachea, Short Axis of Greater Vessels, Ductal Arch, Aortic Arch,   Interventricular Septum, Interatrial Septum, Cardiac Axis, Cardiac   Position);    ABD  CAV , STOMACH, RIGHT KIDNEY, LEFT KIDNEY, BLADDER, ABD  WALL, SPINE: (Cervical Spine, Thoracic Spine, Lumbar Spine, Sacrum);      EXTREMS: (Lt Humerus, Rt Humerus, Lt Forearm, Rt Forearm, Lt Hand, Rt   Hand, Lt Femur, Rt Femur, Lt Low Leg, Rt Low Leg, Lt Foot, Rt Foot);      GENITALIA (Male), PLACENTA, UMBL  CORD, UTERUS, PCI      Fetus B   Visualized Appearing Sonographically Normal:   HEAD: (Calvarium, BPD Level, Cavum, Lateral Ventricles, Choroid Plexus,   Cerebellum, Cisterna Magna);    FACE/NECK: (Neck, Nuchal Fold, Profile,   Orbits, Nose/Lips, Palate, Face);    TH  CAV : (Diaphragm); HEART:   (Four Chamber View, Proximal Left Outflow, Proximal Right Outflow, 3   Vessel Trachea, Short Axis of Greater Vessels, Ductal Arch, Aortic Arch,   Interventricular Septum, Interatrial Septum, Cardiac Axis, Cardiac   Position);    ABD  CAV , STOMACH, RIGHT KIDNEY, LEFT KIDNEY, BLADDER, ABD     WALL, SPINE: (Cervical Spine, Thoracic Spine, Lumbar Spine, Sacrum);      EXTREMS: (Lt Humerus, Rt Humerus, Lt Forearm, Rt Forearm, Lt Hand, Rt   Hand, Lt Femur, Rt Femur, Lt Low Leg, Rt Low Leg, Lt Foot, Rt Foot);      GENITALIA (Male), PLACENTA, UMBL  CORD, UTERUS, PCI      ADNEXA      The left ovary was not visualized  The right ovary appeared normal and   measured 2 1 x 1 6 x 1 0 cm with a volume of 1 8 cc  IMPRESSION      Twin IUP (Fetus A)   19 weeks and 4 days by this ultrasound  (RAMIREZ=DEC 15 2016)   20 weeks and 3 days by 1st Tri Sono  (RAMIREZ=DEC 9 2016)   Breech presentation   Fetal growth appeared normal   Normal anatomy survey   Regular fetal heart rate of 148 bpm   Fundal placenta   No placenta previa   Fetal position = Inferior, Left   Monochorionic, diamniotic      Twin IUP (Fetus B)   19 weeks and 6 days by this ultrasound  (RAMIREZ=DEC 13 2016)   20 weeks and 3 days by 1st Tri Sono  (RAMIREZ=DEC 9 2016)   Vertex presentation   Fetal growth appeared normal   Normal anatomy survey   Regular fetal heart rate of 149 bpm   Posterior placenta   No placenta previa   Monochorionic, diamniotic      GENERAL COMMENT      Today's ultrasound findings are most consistent with a monochorionic twin   pregnancy  There appears to be one single placenta with a thin dividing   membranes  I reviewed the images from the prior ultrasound which did not   mention chorionicity  Given those findings, it appears that this is a   monochorionic twin pregnancy  Both fetuses appear male  The fetal anatomic survey is complete on both twins  There is no   sonographic evidence of fetal abnormalities at this time  Good fetal   movement and tone are seen  The amniotic fluid volume appears normal   around each twins  A transvaginal ultrasound was performed to assess the   cervix, which was not seen well transabdominally  The cervical length was   3 5 centimeters, which is normal for the current gestational age  There   was no significant funneling or dynamic changes appreciated  The patient   was informed of today's findings and all of her questions were answered  The limitations of ultrasound were reviewed with the patient, which she   accepts  We discussed the implications of twin pregnancy with regard to increased   risk of  birth, preeclampsia, gestational diabetes,   malpresentation, and  section  We discussed that on average twins   will deliver around 35 weeks and that we would recommend delivery by 37   weeks in appropriately grown Monochorionic twin pregnancy  We recommend   cervical surveillance  given the increased risk of  birth as well   as antepartum fetal surveillance starting at 32 weeks given the increased    morbidity and mortality of a twin pregnancy  We recommend   supplementation with folic acid and iron given the increased demand on the   maternal supply due to the twin pregnancy  We discussed the unique nature   of a monochorionic twin pregnancy with increased risk for twin-twin   transfusion which can occur at any gestational age, even late third   trimester  Additional surveillance is recommended every 2 weeks with   ultrasound evaluation at the  Center to screen for this condition   which occurs in approximately 15% of all monochorionic twin pregnancies  Monochorionic twin pregnancies are also at increased risk for structural   defects, specifically heart defects and fetal echocardiography is   recommended  Thank you very much for allowing us to participate in the care of this   very nice patient  Should you have any questions, please do not hesitate   to contact our office  BRETT Little M D     Electronically signed 16 17:15

## 2018-01-17 NOTE — PROGRESS NOTES
Message     Spoke to Patient   Pt  called by BROOKE Haines as  (Pt Guyanese Speaking) and told she needs to make appt ASAP, and also needs to get 28 wk  labs done  Importance of compliance stressed to pt  Patient Care Team    Care Team Member Role Specialty Office Number   Lili COX  - [phone]   Kelsy COX MD, error  - [phone]   Zeny COX  - [phone]   Kristyn COX    - [phone]   Leon Lopes [phone]   [facility] [phone]     Signatures   Electronically signed by : Inna Crowe RN; Nov  3 2016  1:53PM EST                       (Author)

## 2018-12-19 ENCOUNTER — DOCUMENTATION (OUTPATIENT)
Dept: AUDIOLOGY | Age: 28
End: 2018-12-19

## 2018-12-19 NOTE — LETTER
2018      [id number]  1990  Parent(s) of: Yahaira Jude    Dear Parent(s):   Our records show that your child passed the  hearing screening  At that time, we recommended a hearing evaluation at 4years of age  NICU stays of 5 days or more, assisted ventilation, ototoxic medications or loop diuretics, and craniofacial anomalies are some of the risk factors for delayed onset hearing loss  Because hearing is important for learning how to talk and for doing well in school, we encourage you to schedule a hearing test  A Pediatric Evaluation is highly recommended  It is your responsibility to schedule this evaluation for your child approximately 3 months before their 3nd birthday by calling our scheduling office [phone]  Please bring a prescription for testing from your primary care and a referral if required by your insurance  Thank you for your time    Sincerely,  Emil Pena MD

## 2021-02-25 ENCOUNTER — TELEMEDICINE (OUTPATIENT)
Dept: FAMILY MEDICINE CLINIC | Facility: CLINIC | Age: 31
End: 2021-02-25

## 2021-02-25 DIAGNOSIS — R05.9 COUGH: Primary | ICD-10-CM

## 2021-02-25 PROCEDURE — 99213 OFFICE O/P EST LOW 20 MIN: CPT | Performed by: FAMILY MEDICINE

## 2021-02-25 RX ORDER — AZITHROMYCIN 250 MG/1
TABLET, FILM COATED ORAL
Qty: 6 TABLET | Refills: 0 | Status: SHIPPED | OUTPATIENT
Start: 2021-02-25 | End: 2021-03-02

## 2021-12-16 ENCOUNTER — LAB (OUTPATIENT)
Dept: LAB | Facility: CLINIC | Age: 31
End: 2021-12-16
Payer: COMMERCIAL

## 2021-12-16 ENCOUNTER — OFFICE VISIT (OUTPATIENT)
Dept: FAMILY MEDICINE CLINIC | Facility: CLINIC | Age: 31
End: 2021-12-16

## 2021-12-16 VITALS
WEIGHT: 106 LBS | HEART RATE: 72 BPM | RESPIRATION RATE: 14 BRPM | SYSTOLIC BLOOD PRESSURE: 90 MMHG | TEMPERATURE: 97.7 F | HEIGHT: 59 IN | BODY MASS INDEX: 21.37 KG/M2 | DIASTOLIC BLOOD PRESSURE: 58 MMHG

## 2021-12-16 DIAGNOSIS — Z00.00 ANNUAL PHYSICAL EXAM: ICD-10-CM

## 2021-12-16 DIAGNOSIS — Z00.00 ANNUAL PHYSICAL EXAM: Primary | ICD-10-CM

## 2021-12-16 LAB
ALBUMIN SERPL BCP-MCNC: 4.3 G/DL (ref 3.5–5)
ALP SERPL-CCNC: 53 U/L (ref 46–116)
ALT SERPL W P-5'-P-CCNC: 32 U/L (ref 12–78)
ANION GAP SERPL CALCULATED.3IONS-SCNC: 3 MMOL/L (ref 4–13)
AST SERPL W P-5'-P-CCNC: 21 U/L (ref 5–45)
BASOPHILS # BLD AUTO: 0.04 THOUSANDS/ΜL (ref 0–0.1)
BASOPHILS NFR BLD AUTO: 1 % (ref 0–1)
BILIRUB SERPL-MCNC: 0.39 MG/DL (ref 0.2–1)
BUN SERPL-MCNC: 8 MG/DL (ref 5–25)
CALCIUM SERPL-MCNC: 9.3 MG/DL (ref 8.3–10.1)
CHLORIDE SERPL-SCNC: 107 MMOL/L (ref 100–108)
CHOLEST SERPL-MCNC: 166 MG/DL
CO2 SERPL-SCNC: 28 MMOL/L (ref 21–32)
CREAT SERPL-MCNC: 0.72 MG/DL (ref 0.6–1.3)
EOSINOPHIL # BLD AUTO: 0.03 THOUSAND/ΜL (ref 0–0.61)
EOSINOPHIL NFR BLD AUTO: 1 % (ref 0–6)
ERYTHROCYTE [DISTWIDTH] IN BLOOD BY AUTOMATED COUNT: 12.6 % (ref 11.6–15.1)
GFR SERPL CREATININE-BSD FRML MDRD: 113 ML/MIN/1.73SQ M
GLUCOSE P FAST SERPL-MCNC: 90 MG/DL (ref 65–99)
HCT VFR BLD AUTO: 40.1 % (ref 34.8–46.1)
HDLC SERPL-MCNC: 64 MG/DL
HGB BLD-MCNC: 13.2 G/DL (ref 11.5–15.4)
IMM GRANULOCYTES # BLD AUTO: 0.01 THOUSAND/UL (ref 0–0.2)
IMM GRANULOCYTES NFR BLD AUTO: 0 % (ref 0–2)
LDLC SERPL CALC-MCNC: 92 MG/DL (ref 0–100)
LYMPHOCYTES # BLD AUTO: 2.02 THOUSANDS/ΜL (ref 0.6–4.47)
LYMPHOCYTES NFR BLD AUTO: 41 % (ref 14–44)
MCH RBC QN AUTO: 29.5 PG (ref 26.8–34.3)
MCHC RBC AUTO-ENTMCNC: 32.9 G/DL (ref 31.4–37.4)
MCV RBC AUTO: 90 FL (ref 82–98)
MONOCYTES # BLD AUTO: 0.33 THOUSAND/ΜL (ref 0.17–1.22)
MONOCYTES NFR BLD AUTO: 7 % (ref 4–12)
NEUTROPHILS # BLD AUTO: 2.51 THOUSANDS/ΜL (ref 1.85–7.62)
NEUTS SEG NFR BLD AUTO: 50 % (ref 43–75)
NONHDLC SERPL-MCNC: 102 MG/DL
NRBC BLD AUTO-RTO: 0 /100 WBCS
PLATELET # BLD AUTO: 256 THOUSANDS/UL (ref 149–390)
PMV BLD AUTO: 10.4 FL (ref 8.9–12.7)
POTASSIUM SERPL-SCNC: 3.8 MMOL/L (ref 3.5–5.3)
PROT SERPL-MCNC: 8.3 G/DL (ref 6.4–8.2)
RBC # BLD AUTO: 4.47 MILLION/UL (ref 3.81–5.12)
SODIUM SERPL-SCNC: 138 MMOL/L (ref 136–145)
TRIGL SERPL-MCNC: 52 MG/DL
WBC # BLD AUTO: 4.94 THOUSAND/UL (ref 4.31–10.16)

## 2021-12-16 PROCEDURE — 80061 LIPID PANEL: CPT

## 2021-12-16 PROCEDURE — 36415 COLL VENOUS BLD VENIPUNCTURE: CPT

## 2021-12-16 PROCEDURE — 85025 COMPLETE CBC W/AUTO DIFF WBC: CPT

## 2021-12-16 PROCEDURE — 99212 OFFICE O/P EST SF 10 MIN: CPT | Performed by: FAMILY MEDICINE

## 2021-12-16 PROCEDURE — 80053 COMPREHEN METABOLIC PANEL: CPT

## 2021-12-20 ENCOUNTER — TELEPHONE (OUTPATIENT)
Dept: FAMILY MEDICINE CLINIC | Facility: CLINIC | Age: 31
End: 2021-12-20

## 2021-12-28 PROCEDURE — U0005 INFEC AGEN DETEC AMPLI PROBE: HCPCS | Performed by: FAMILY MEDICINE

## 2021-12-28 PROCEDURE — U0003 INFECTIOUS AGENT DETECTION BY NUCLEIC ACID (DNA OR RNA); SEVERE ACUTE RESPIRATORY SYNDROME CORONAVIRUS 2 (SARS-COV-2) (CORONAVIRUS DISEASE [COVID-19]), AMPLIFIED PROBE TECHNIQUE, MAKING USE OF HIGH THROUGHPUT TECHNOLOGIES AS DESCRIBED BY CMS-2020-01-R: HCPCS | Performed by: FAMILY MEDICINE

## 2021-12-29 ENCOUNTER — TELEMEDICINE (OUTPATIENT)
Dept: FAMILY MEDICINE CLINIC | Facility: CLINIC | Age: 31
End: 2021-12-29

## 2021-12-29 DIAGNOSIS — J06.9 UPPER RESPIRATORY TRACT INFECTION, UNSPECIFIED TYPE: Primary | ICD-10-CM

## 2021-12-29 LAB — SARS-COV-2 RNA RESP QL NAA+PROBE: POSITIVE

## 2021-12-29 PROCEDURE — 99212 OFFICE O/P EST SF 10 MIN: CPT | Performed by: FAMILY MEDICINE

## 2022-01-28 NOTE — PROGRESS NOTES
COVID-19 Virtual Visit     Assessment/Plan:    Problem List Items Addressed This Visit     None      Visit Diagnoses     Cough    -  Primary    Relevant Medications    azithromycin (ZITHROMAX) 250 mg tablet         Disposition:     Simple URI symptoms, I don't believe this is COVID at this time, if her symptoms worsen she understands to call us at our office   StartedOn antibiotic  I have spent 15 minutes directly with the patient  Encounter provider Pascual Gordillo MD    Provider located at [address] [place] [id number]    Recent Visits  Date Type Provider Dept   02/25/21 Telemedicine Pascual Gordillo  [facility] recent visits within past 7 days and meeting all other requirements     Future Appointments  No visits were found meeting these conditions  Showing future appointments within next 150 days and meeting all other requirements        Patient agrees to participate in a virtual check in via telephone or video visit instead of presenting to the office to address urgent/immediate medical needs  Patient is aware this is a billable service  After connecting through Telephone, the patient was identified by name and date of birth  Particia Credit was informed that this was a telemedicine visit and that the exam was being conducted confidentially over secure lines  My office door was closed  No one else was in the room  Particia Credit acknowledged consent and understanding of privacy and security of the telemedicine visit  I informed the patient that I have reviewed her record in Epic and presented the opportunity for her to ask any questions regarding the visit today  The patient agreed to participate  It was my intent to perform this visit via video technology but the patient was not able to do a video connection so the visit was completed via audio telephone only          Subjective:   Particia Credit is a 35 y o  female who is concerned about COVID-19  Patient's symptoms include fatigue, sore throat, cough (phlehm green), chest tightness (comes and goes) and headache  Patient denies fever, chills, congestion, anosmia, loss of taste, abdominal pain, nausea, vomiting, diarrhea and myalgias  Date of symptom onset: 2/18/2021    Exposure:   Contact with a person who is under investigation (PUI) for or who is positive for COVID-19 within the last 14 days?: No    Hospitalized recently for fever and/or lower respiratory symptoms?: Yes      Currently a healthcare worker that is involved in direct patient care?: Yes      Works in a special setting where the risk of COVID-19 transmission may be high? (this may include long-term care, correctional and correction facilities; homeless shelters; assisted-living facilities and group homes ): Yes      Resident in a special setting where the risk of COVID-19 transmission may be high? (this may include long-term care, correctional and correction facilities; homeless shelters; assisted-living facilities and group homes ): Yes       Patient started with a sore throat/ cough  She states this been going on for 1 week  After she was concerned given COVID  She states that her symptoms have been persistent for 1 week but has never had a fever  Denies any close contact with anyone with  COVID  No results found for: [facility], [address] [facility],[address], [place] [address]  Past Medical History:   Diagnosis Date    Varicella      No past surgical history on file  Current Outpatient Medications   Medication Sig Dispense Refill    azithromycin (ZITHROMAX) 250 mg tablet Take 2 tablets today then 1 tablet daily x 4 days 6 tablet 0    Multiple Vitamin (MULTIVITAMIN) tablet Take 1 tablet by mouth daily       No current facility-administered medications for this visit  No Known Allergies    Review of Systems   Constitutional: Positive for fatigue  Negative for chills and fever     HENT: Positive for sore throat  Negative for congestion  Respiratory: Positive for cough (phlehm green) and chest tightness (comes and goes)  Gastrointestinal: Negative for abdominal pain, diarrhea, nausea and vomiting  Musculoskeletal: Negative for myalgias  Neurological: Positive for headaches  Objective: There were no vitals filed for this visit  Physical Exam  Constitutional:       Appearance: She is well-developed  HENT:      Head: Normocephalic and atraumatic  Pulmonary:      Effort: Pulmonary effort is normal    Musculoskeletal: Normal range of motion  Neurological:      Mental Status: She is alert and oriented to person, place, and time  Psychiatric:         Behavior: Behavior normal          Thought Content: Thought content normal          Judgment: Judgment normal        VIRTUAL VISIT DISCLAIMER    Sharlene Rakesh acknowledges that she has consented to an online visit or consultation  She understands that the online visit is based solely on information provided by her, and that, in the absence of a face-to-face physical evaluation by the physician, the diagnosis she receives is both limited and provisional in terms of accuracy and completeness  This is not intended to replace a full medical face-to-face evaluation by the physician  Sharlene Cameron understands and accepts these terms  mammogram

## 2022-02-01 ENCOUNTER — TELEPHONE (OUTPATIENT)
Dept: FAMILY MEDICINE CLINIC | Facility: CLINIC | Age: 32
End: 2022-02-01

## 2022-02-01 NOTE — TELEPHONE ENCOUNTER
Paperwork filled out  Please advise her that she forgot to place SSN therefore can't fax  Unless she doesn't have one

## 2022-02-01 NOTE — TELEPHONE ENCOUNTER
Dr Caprice Carmona:    Patient dropped off forms for your review and signature  Please contact when forms are ready for
